# Patient Record
Sex: MALE | Race: BLACK OR AFRICAN AMERICAN | NOT HISPANIC OR LATINO | URBAN - METROPOLITAN AREA
[De-identification: names, ages, dates, MRNs, and addresses within clinical notes are randomized per-mention and may not be internally consistent; named-entity substitution may affect disease eponyms.]

---

## 2022-07-08 ENCOUNTER — APPOINTMENT (RX ONLY)
Dept: URBAN - METROPOLITAN AREA OTHER 9 | Facility: OTHER | Age: 47
Setting detail: DERMATOLOGY
End: 2022-07-08

## 2022-07-08 DIAGNOSIS — B07.8 OTHER VIRAL WARTS: ICD-10-CM

## 2022-07-08 PROCEDURE — ? BENIGN DESTRUCTION

## 2022-07-08 PROCEDURE — 17110 DESTRUCTION B9 LES UP TO 14: CPT

## 2022-07-08 PROCEDURE — ? COUNSELING

## 2022-07-08 PROCEDURE — ? SKIN MEDICINALS

## 2022-07-08 ASSESSMENT — LOCATION SIMPLE DESCRIPTION DERM
LOCATION SIMPLE: LEFT RING FINGER
LOCATION SIMPLE: LEFT HAND

## 2022-07-08 ASSESSMENT — LOCATION DETAILED DESCRIPTION DERM
LOCATION DETAILED: LEFT PROXIMAL RADIAL PALMAR RING FINGER
LOCATION DETAILED: LEFT THENAR EMINENCE

## 2022-07-08 ASSESSMENT — LOCATION ZONE DERM
LOCATION ZONE: HAND
LOCATION ZONE: FINGER

## 2022-07-08 NOTE — PROCEDURE: [facility]
Sig: Apply to affected areas twice daily
Detail Level: Simple
Product Type (1): Warts
Wart Solution Prescription 1: 5-Fluorouracil 5%, Salicylic Acid 70% Paste
Sig: Apply pea sized amount per area at night
Sig: Apply a thin layer to the affected areas daily
Sig: Apply a thin layer to the affected areas twice daily
Sig: Apply a thin layer to the scar daily
Sig: Take one twice daily
Sig: Apply a thin layer to the itching areas twice daily as needed
Sig: Apply a thin layer to the affected skin twice daily
Sig: Apply to affected areas on face twice daily
Sig: Apply nightly to warts nightly under occlusion
Sig: Apply twice daily for 5 days
Sig: Wash affected areas daily.
Intro Statement: I recommended the following products: wart paste at night with Vaseline around and covered with tape

## 2022-07-08 NOTE — PROCEDURE: BENIGN DESTRUCTION
Add 52 Modifier (Optional): no
Medical Necessity Information: It is in your best interest to select a reason for this procedure from the list below. All of these items fulfill various CMS LCD requirements except the new and changing color options.
Anesthesia Volume In Cc: 2
Medical Necessity Clause: This procedure was medically necessary because the lesions that were treated were:
Post-Care Instructions: I reviewed with the patient in detail post-care instructions. Patient is to wear sunprotection, and avoid picking at any of the treated lesions. Pt may apply Vaseline to crusted or scabbing areas.
Detail Level: Detailed
Consent: The patient's consent was obtained including but not limited to risks of crusting, scabbing, blistering, scarring, darker or lighter pigmentary change, recurrence, incomplete removal and infection.
Treatment Number (Will Not Render If 0): 0